# Patient Record
Sex: FEMALE | Race: NATIVE HAWAIIAN OR OTHER PACIFIC ISLANDER
[De-identification: names, ages, dates, MRNs, and addresses within clinical notes are randomized per-mention and may not be internally consistent; named-entity substitution may affect disease eponyms.]

---

## 2018-09-05 ENCOUNTER — HOSPITAL ENCOUNTER (OUTPATIENT)
Dept: HOSPITAL 47 - RADUSMAIN | Age: 53
Discharge: HOME | End: 2018-09-05
Payer: COMMERCIAL

## 2018-09-05 DIAGNOSIS — E11.9: ICD-10-CM

## 2018-09-05 DIAGNOSIS — K76.89: Primary | ICD-10-CM

## 2018-09-05 DIAGNOSIS — K83.8: ICD-10-CM

## 2018-09-05 DIAGNOSIS — R94.39: ICD-10-CM

## 2018-09-05 DIAGNOSIS — Z90.49: ICD-10-CM

## 2018-09-05 PROCEDURE — 76705 ECHO EXAM OF ABDOMEN: CPT

## 2018-09-05 PROCEDURE — 93017 CV STRESS TEST TRACING ONLY: CPT

## 2018-09-05 PROCEDURE — 78452 HT MUSCLE IMAGE SPECT MULT: CPT

## 2018-09-05 NOTE — US
EXAMINATION TYPE: US abdomen limited

 

DATE OF EXAM: 9/5/2018

 

COMPARISON: NONE

 

CLINICAL HISTORY: 53-year-old female K78.0 fatty liver.

 

TECHNIQUE: Multiple sonographic images of the right upper quadrant are obtained.

 

FINDINGS:

 

EXAM MEASUREMENTS:

 

Liver Length:  17.6 cm   

Gallbladder:  Surgically absent

CBD:  0.7 cm

Right Kidney:  10.7 x 5.0 x 4.6  cm

 

 

 

Pancreas:  Obscured by bowel gas

Liver:  Obscured by overlying bowel gas, difficult to penetrate either due to fatty infiltration or p
atient technical characteristics and soft tissue attenuation.

Gallbladder:  Surgically absent

**Evidence for sonographic Archer's sign:  No

CBD:  Mildly dilated but within normal limits given postcholecystectomy status. 

Right Kidney:  No hydronephrosis. 

 

 

 

IMPRESSION: 

Attenuating liver either due to technical factors or underlying hepatic steatosis. Body habitus and b
owel gas limits the ultrasound evaluation.

 

Bile duct mildly (7 mm) dilated but within normal limits given postcholecystectomy status.

## 2018-09-05 NOTE — NM
EXAMINATION TYPE: NM stress cardiolite complete

 

DATE OF EXAM: 9/5/2018

 

COMPARISON: NONE

 

HISTORY: abnormal CV function study

 

 

TECHNIQUE:  After the intravenous administration of 10.82 mCi Tc 99m Sestamibi - Rest images obtained
 40 minutes post injection.  The patient exercised using a  TOPHER protocol and 1 minute prior to peak
 exercise was injected with 25.4 mCi Tc 99m Sestamibi - Stress images obtained 5 minutes post injecti
on.

 

FINDINGS: 

 

Targeted heart rate was achieved during performance of the study. Review of stress and rest SPECT mohini
ges demonstrates no distinct perfusion abnormality.  Gated analysis shows normal wall motion with an 
estimated left ventricular ejection fraction of 50 %.

 

 

 

IMPRESSION:  

 

No scintigraphic evidence for reversible ischemia

## 2018-09-05 NOTE — EST
EXERCISE STRESS



AGE:

53



SEX:

F



HT:

5'2"



WT:

196



PROTOCOL:

Cardiolite Skip Stress Test



STAGE:

II



DURATION OF EXERCISE:

5:00



HEART RATE REST:

82



BLOOD PRESSURE REST:

146/79



MAXIMUM HEART RATE ACHIEVED:

155



MAXIMUM BLOOD PRESSURE:

169/66



85% MPHR:

141



100% MPHR:

166



METS:

8.9



INDICATIONS:

High calcium score.



CLINICAL INFORMATION:

Patient was exercised for a total period of 5 minutes, peak heart rate of 155 was

achieved. Maximum blood pressure 169/66 mmHg was noted.  Patient did not complain of

any chest pain during the test. Resting EKG shows normal sinus rhythm with normal DE

interval and QRS duration and nonspecific ST-T changes.  No ST-segment depression

suggestive of ischemia was noted.  Patient did not complain of any chest pain during

the test.



FINAL IMPRESSION:

1. This exercise test is not suggestive of ischemia.

2. Patient's exercise tolerance is limited.

3. No dysrhythmias are noted.





MMODL / IJN: [id number] / Job#: [id number]

## 2019-06-07 ENCOUNTER — HOSPITAL ENCOUNTER (EMERGENCY)
Dept: HOSPITAL 47 - EC | Age: 54
LOS: 1 days | Discharge: HOME | End: 2019-06-08
Payer: COMMERCIAL

## 2019-06-07 VITALS — RESPIRATION RATE: 18 BRPM

## 2019-06-07 DIAGNOSIS — R10.12: ICD-10-CM

## 2019-06-07 DIAGNOSIS — R19.7: ICD-10-CM

## 2019-06-07 DIAGNOSIS — Z87.19: ICD-10-CM

## 2019-06-07 DIAGNOSIS — Z90.49: ICD-10-CM

## 2019-06-07 DIAGNOSIS — R11.2: ICD-10-CM

## 2019-06-07 DIAGNOSIS — N39.0: Primary | ICD-10-CM

## 2019-06-07 LAB
ALBUMIN SERPL-MCNC: 5.3 G/DL (ref 3.5–5)
ALP SERPL-CCNC: 122 U/L (ref 38–126)
ALT SERPL-CCNC: 25 U/L (ref 9–52)
AMYLASE SERPL-CCNC: 61 U/L (ref 30–110)
ANION GAP SERPL CALC-SCNC: 15 MMOL/L
AST SERPL-CCNC: 28 U/L (ref 14–36)
BASOPHILS # BLD AUTO: 0 K/UL (ref 0–0.2)
BASOPHILS NFR BLD AUTO: 0 %
BUN SERPL-SCNC: 27 MG/DL (ref 7–17)
CALCIUM SPEC-MCNC: 10.2 MG/DL (ref 8.4–10.2)
CHLORIDE SERPL-SCNC: 97 MMOL/L (ref 98–107)
CO2 SERPL-SCNC: 26 MMOL/L (ref 22–30)
EOSINOPHIL # BLD AUTO: 0 K/UL (ref 0–0.7)
EOSINOPHIL NFR BLD AUTO: 0 %
ERYTHROCYTE [DISTWIDTH] IN BLOOD BY AUTOMATED COUNT: 5.35 M/UL (ref 3.8–5.4)
ERYTHROCYTE [DISTWIDTH] IN BLOOD: 13.7 % (ref 11.5–15.5)
GLUCOSE SERPL-MCNC: 309 MG/DL (ref 74–99)
GLUCOSE UR QL: (no result)
HCT VFR BLD AUTO: 47.2 % (ref 34–46)
HGB BLD-MCNC: 16.1 GM/DL (ref 11.4–16)
HYALINE CASTS UR QL AUTO: 6 /LPF (ref 0–2)
KETONES UR QL STRIP.AUTO: (no result)
LIPASE SERPL-CCNC: 130 U/L (ref 23–300)
LYMPHOCYTES # SPEC AUTO: 2.3 K/UL (ref 1–4.8)
LYMPHOCYTES NFR SPEC AUTO: 29 %
MCH RBC QN AUTO: 30.1 PG (ref 25–35)
MCHC RBC AUTO-ENTMCNC: 34.1 G/DL (ref 31–37)
MCV RBC AUTO: 88.3 FL (ref 80–100)
MONOCYTES # BLD AUTO: 0.3 K/UL (ref 0–1)
MONOCYTES NFR BLD AUTO: 3 %
NEUTROPHILS # BLD AUTO: 5.3 K/UL (ref 1.3–7.7)
NEUTROPHILS NFR BLD AUTO: 66 %
PH UR: 5 [PH] (ref 5–8)
PLATELET # BLD AUTO: 296 K/UL (ref 150–450)
POTASSIUM SERPL-SCNC: 4.2 MMOL/L (ref 3.5–5.1)
PROT SERPL-MCNC: 9.1 G/DL (ref 6.3–8.2)
SODIUM SERPL-SCNC: 138 MMOL/L (ref 137–145)
SP GR UR: 1.03 (ref 1–1.03)
SQUAMOUS UR QL AUTO: 3 /HPF (ref 0–4)
UROBILINOGEN UR QL STRIP: <2 MG/DL (ref ?–2)
WBC # BLD AUTO: 8 K/UL (ref 3.8–10.6)

## 2019-06-07 PROCEDURE — 99284 EMERGENCY DEPT VISIT MOD MDM: CPT

## 2019-06-07 PROCEDURE — 96361 HYDRATE IV INFUSION ADD-ON: CPT

## 2019-06-07 PROCEDURE — 87077 CULTURE AEROBIC IDENTIFY: CPT

## 2019-06-07 PROCEDURE — 83690 ASSAY OF LIPASE: CPT

## 2019-06-07 PROCEDURE — 96360 HYDRATION IV INFUSION INIT: CPT

## 2019-06-07 PROCEDURE — 36415 COLL VENOUS BLD VENIPUNCTURE: CPT

## 2019-06-07 PROCEDURE — 87086 URINE CULTURE/COLONY COUNT: CPT

## 2019-06-07 PROCEDURE — 87186 SC STD MICRODIL/AGAR DIL: CPT

## 2019-06-07 PROCEDURE — 80053 COMPREHEN METABOLIC PANEL: CPT

## 2019-06-07 PROCEDURE — 82150 ASSAY OF AMYLASE: CPT

## 2019-06-07 PROCEDURE — 85025 COMPLETE CBC W/AUTO DIFF WBC: CPT

## 2019-06-07 PROCEDURE — 74176 CT ABD & PELVIS W/O CONTRAST: CPT

## 2019-06-07 PROCEDURE — 81001 URINALYSIS AUTO W/SCOPE: CPT

## 2019-06-07 NOTE — CT
EXAM:

  CT Abdomen and Pelvis Without Intravenous Contrast

 

CLINICAL HISTORY:

  ITS.REASON CT Reason: Pain

 

TECHNIQUE:

  Axial computed tomography images of the abdomen and pelvis without 

intravenous contrast.  CTDI is 13.6 mGy and DLP is 747.5 mGy-cm.  This CT 

exam was performed using one or more of the following dose reduction 

techniques: automated exposure control, adjustment of the mA and/or kV 

according to patient size, and/or use of iterative reconstruction 

technique.

 

COMPARISON:

  No relevant prior studies available.

 

FINDINGS:

The lung bases are clear.

 

Status post cholecystectomy.  No biliary ductal dilation.  The liver, 

spleen, kidneys, and adrenal glands are within normal limits for 

noncontrast technique.

 

There is no bowel obstruction or perforation.  Colonic wall thickening is 

felt to reflect lack of distention.  No adjacent stranding to indicate 

colitis.  Unremarkable appendix.

 

Minimal aortic atherosclerosis.  No AAA.  There is also iliofemoral 

atherosclerosis.  Fatty umbilical hernia.  

 

No acute fracture.  Sclerotic lesions to the bony pelvis, likely benign 

bone islands/osteopoikilosis.  No acute fracture.

 

IMPRESSION:     

Fatty umbilical hernia.

 

Cholecystectomy.

 

Other incidental findings as above.

## 2019-06-07 NOTE — ED
Abdominal Pain HPI





- General


Chief Complaint: Abdominal Pain


Stated Complaint: Abd Pain


Time Seen by Provider: 06/07/19 21:46


Source: patient


Mode of arrival: ambulatory


Limitations: no limitations





- History of Present Illness


Initial Comments: 





Patient is a 54-year-old female complaining of abdominal pain 5 days.  Patient 

states she's also had episodes of nausea and vomiting on and off throughout the 

5 days.  Patient admits to history of pancreatitis in 2003 and this feels 

similar.  Patient states the pain is in the left upper quadrant with radiation 

to the left back and currently is a 4/10 pain.  Patient admits to history of 

cholecystectomy.  Patient admits to a couple episodes of diarrhea in the past 

couple days.  Denies any recent travel or antibiotic use.  Patient denies 

alcohol abuse, smoking.  Patient denies chest pain, shortness of breath, urinary

symptoms.





- Related Data


                                  Previous Rx's











 Medication  Instructions  Recorded


 


Cephalexin [Keflex] 500 mg PO Q12HR 10 Days #20 cap 06/07/19











                                    Allergies











Allergy/AdvReac Type Severity Reaction Status Date / Time


 


No Known Allergies Allergy   Verified 06/07/19 19:34














Review of Systems


ROS Statement: 


Those systems with pertinent positive or pertinent negative responses have been 

documented in the HPI.





ROS Other: All systems not noted in ROS Statement are negative.





Past Medical History


Past Medical History: Diabetes Mellitus


Additional Past Medical History / Comment(s): pancreatitis


History of Any Multi-Drug Resistant Organisms: None Reported


Past Surgical History: Cholecystectomy, Joint Replacement, Orthopedic Surgery


Past Psychological History: No Psychological Hx Reported


Smoking Status: Never smoker


Past Alcohol Use History: None Reported


Past Drug Use History: None Reported





General Exam





- General Exam Comments


Initial Comments: 





GENERAL: Well-appearing, well-nourished and in no acute distress.


HEAD: Atraumatic, normocephalic.


EYES: Pupils equal round and reactive to light, extraocular movements intact, 

sclera anicteric, conjunctiva are normal.


ENT: TMs normal, nares patent, oropharynx clear without exudates.  Moist mucous 

membranes.


NECK: Normal range of motion, supple without lymphadenopathy or JVD.


LUNGS: Breath sounds clear to auscultation bilaterally and equal.  No wheezes 

rales or rhonchi.


HEART: Regular rate and rhythm without murmurs, rubs or gallops.


ABDOMEN: Tender to palpation in the left upper quadrant.  Soft, normoactive 

bowel sounds.  No guarding, no rebound.  No masses appreciated.  Mild left CVA 

tenderness.


: Deferred 


EXTREMITIES: Normal range of motion, no pitting or edema.  No clubbing or 

cyanosis.


NEUROLOGICAL: Cranial nerves II through XII grossly intact.  Normal speech, 

normal gait.


PSYCH: Normal mood, normal affect.


SKIN: Warm, Dry, normal turgor, no rashes or lesions noted.


Limitations: no limitations





Course


                                   Vital Signs











  06/07/19 06/07/19 06/07/19





  19:31 22:57 23:55


 


Temperature 99 F 98.2 F 97.9 F


 


Pulse Rate 101 H 88 85


 


Respiratory 18 18 18





Rate   


 


Blood Pressure 131/83 139/69 117/63


 


O2 Sat by Pulse 98 99 98





Oximetry   














Medical Decision Making





- Medical Decision Making





Patient is a 54-year-old female complaining of left upper quadrant pain 5 days.

  Patient reports associated nausea and vomiting.  Patient states history of 

pancreatitis in 2003 and this feels similar.  On exam patient has tenderness in 

the upper left quadrant and mild left-sided CVA tenderness.  No other complaints

 at this time.CBC is unremarkable except slightly elevated hemoglobin at 16.1.  

No other labs to compare it to.  Glucose was 309.  Lipase 130.  UA revealed 

positive nitrate, moderate amount of bacteria and leukocyte esterase, as well as

 4+ glucose and 1+ ketones.  CT abdomen showed no acute process.  Patient 

received fluids and reports improvement.  Patient will be treated for a UTI and 

urged to continue increasing her fluid amount.  Case discussed with Dr. Felix.  Patient will be discharged home.





- Lab Data


Result diagrams: 


                                 06/07/19 21:53





                                 06/07/19 21:53


                                   Lab Results











  06/07/19 06/07/19 06/07/19 Range/Units





  21:53 21:53 21:53 


 


WBC   8.0   (3.8-10.6)  k/uL


 


RBC   5.35   (3.80-5.40)  m/uL


 


Hgb   16.1 H   (11.4-16.0)  gm/dL


 


Hct   47.2 H   (34.0-46.0)  %


 


MCV   88.3   (80.0-100.0)  fL


 


MCH   30.1   (25.0-35.0)  pg


 


MCHC   34.1   (31.0-37.0)  g/dL


 


RDW   13.7   (11.5-15.5)  %


 


Plt Count   296   (150-450)  k/uL


 


Neutrophils %   66   %


 


Lymphocytes %   29   %


 


Monocytes %   3   %


 


Eosinophils %   0   %


 


Basophils %   0   %


 


Neutrophils #   5.3   (1.3-7.7)  k/uL


 


Lymphocytes #   2.3   (1.0-4.8)  k/uL


 


Monocytes #   0.3   (0-1.0)  k/uL


 


Eosinophils #   0.0   (0-0.7)  k/uL


 


Basophils #   0.0   (0-0.2)  k/uL


 


Sodium  138    (137-145)  mmol/L


 


Potassium  4.2    (3.5-5.1)  mmol/L


 


Chloride  97 L    ()  mmol/L


 


Carbon Dioxide  26    (22-30)  mmol/L


 


Anion Gap  15    mmol/L


 


BUN  27 H    (7-17)  mg/dL


 


Creatinine  0.69    (0.52-1.04)  mg/dL


 


Est GFR (CKD-EPI)AfAm  >90    (>60 ml/min/1.73 sqM)  


 


Est GFR (CKD-EPI)NonAf  >90    (>60 ml/min/1.73 sqM)  


 


Glucose  309 H    (74-99)  mg/dL


 


Calcium  10.2    (8.4-10.2)  mg/dL


 


Total Bilirubin  1.8 H    (0.2-1.3)  mg/dL


 


AST  28    (14-36)  U/L


 


ALT  25    (9-52)  U/L


 


Alkaline Phosphatase  122    ()  U/L


 


Total Protein  9.1 H    (6.3-8.2)  g/dL


 


Albumin  5.3 H    (3.5-5.0)  g/dL


 


Amylase  61    ()  U/L


 


Lipase  130    ()  U/L


 


Urine Color    Yellow  


 


Urine Appearance    Cloudy H  (Clear)  


 


Urine pH    5.0  (5.0-8.0)  


 


Ur Specific Gravity    1.032  (1.001-1.035)  


 


Urine Protein    Trace H  (Negative)  


 


Urine Glucose (UA)    4+ H  (Negative)  


 


Urine Ketones    1+ H  (Negative)  


 


Urine Blood    Negative  (Negative)  


 


Urine Nitrite    Positive H  (Negative)  


 


Urine Bilirubin    Negative  (Negative)  


 


Urine Urobilinogen    <2.0  (<2.0)  mg/dL


 


Ur Leukocyte Esterase    Moderate H  (Negative)  


 


Urine WBC    40 H  (0-5)  /hpf


 


Ur Squamous Epith Cells    3  (0-4)  /hpf


 


Urine Bacteria    Moderate H  (None)  /hpf


 


Hyaline Casts    6 H  (0-2)  /lpf


 


Urine Mucus    Many H  (None)  /hpf














Disposition


Clinical Impression: 


 UTI (urinary tract infection)





Disposition: HOME SELF-CARE


Condition: Stable


Instructions (If sedation given, give patient instructions):  Urinary Tract 

Infection in Women (ED)


Additional Instructions: 


Please return to the Emergency Department if symptoms worsen or any other 

concerns.


Prescriptions: 


Cephalexin [Keflex] 500 mg PO Q12HR 10 Days #20 cap


Is patient prescribed a controlled substance at d/c from ED?: No


Referrals: 


Elizabeth Harvey MD [Primary Care Provider] - 1-2 days

## 2019-06-08 VITALS — TEMPERATURE: 97.9 F | SYSTOLIC BLOOD PRESSURE: 117 MMHG | HEART RATE: 85 BPM | DIASTOLIC BLOOD PRESSURE: 63 MMHG

## 2023-10-26 ENCOUNTER — HOSPITAL ENCOUNTER (OUTPATIENT)
Dept: HOSPITAL 47 - RADUSWWP | Age: 58
Discharge: HOME | End: 2023-10-26
Attending: FAMILY MEDICINE
Payer: COMMERCIAL

## 2023-10-26 DIAGNOSIS — K22.4: ICD-10-CM

## 2023-10-26 DIAGNOSIS — R13.10: Primary | ICD-10-CM

## 2023-10-26 PROCEDURE — 74220 X-RAY XM ESOPHAGUS 1CNTRST: CPT

## 2023-10-26 NOTE — FL
EXAMINATION TYPE: FL barium swallow

 

DATE OF EXAM: 10/26/2023 8:47 AM

 

COMPARISON: . None

 

CLINICAL INDICATION:Female, 58 years old with history of R13.10 DYSPHAGIA; PHH,

 

TECHNIQUE: The procedure was explained and patient history elicited.   All patient questions were ans
wered prior to start of procedure. Multiple spot fluoroscopic images of the esophagus were obtained a
fter the oral ingestion of effervescent crystals and liquid barium as the contrast agent.  

Fluoroscopic time: 37 seconds

Fluoroscopic images: 0

Radiographs taken: 56

DAP: 677.61 mGym2

 

FINDINGS:

The esophagus demonstrates normal primary and secondary peristalsis.  The esophageal mucosa is smooth
 without evidence of focal stricture, ulceration, or abnormal outpouching.  No gastroesophageal reflu
x disease was identified. Tertiary contractions are present.

 

On imaging of the pharynx there is some retention of contrast at the base of the tongue within the va
llecula.

 

IMPRESSION:

1. Mild esophageal dysmotility 

2. Mild retention within the vallecula, a speech pathology evaluation may be of benefit.